# Patient Record
Sex: MALE | Race: WHITE | URBAN - METROPOLITAN AREA
[De-identification: names, ages, dates, MRNs, and addresses within clinical notes are randomized per-mention and may not be internally consistent; named-entity substitution may affect disease eponyms.]

---

## 2019-10-31 ENCOUNTER — APPOINTMENT (OUTPATIENT)
Dept: URBAN - METROPOLITAN AREA SURGERY 9 | Age: 74
Setting detail: DERMATOLOGY
End: 2019-10-31

## 2019-10-31 VITALS — RESPIRATION RATE: 12 BRPM | HEART RATE: 60 BPM | SYSTOLIC BLOOD PRESSURE: 124 MMHG | DIASTOLIC BLOOD PRESSURE: 62 MMHG

## 2019-10-31 DIAGNOSIS — D485 NEOPLASM OF UNCERTAIN BEHAVIOR OF SKIN: ICD-10-CM

## 2019-10-31 PROBLEM — K75.9 INFLAMMATORY LIVER DISEASE, UNSPECIFIED: Status: ACTIVE | Noted: 2019-10-31

## 2019-10-31 PROBLEM — M12.9 ARTHROPATHY, UNSPECIFIED: Status: ACTIVE | Noted: 2019-10-31

## 2019-10-31 PROBLEM — I10 ESSENTIAL (PRIMARY) HYPERTENSION: Status: ACTIVE | Noted: 2019-10-31

## 2019-10-31 PROBLEM — D48.5 NEOPLASM OF UNCERTAIN BEHAVIOR OF SKIN: Status: ACTIVE | Noted: 2019-10-31

## 2019-10-31 PROCEDURE — OTHER RETURN TO REFERRING PROVIDER: OTHER

## 2019-10-31 PROCEDURE — 17312 MOHS ADDL STAGE: CPT

## 2019-10-31 PROCEDURE — OTHER MOHS SURGERY: OTHER

## 2019-10-31 PROCEDURE — 17311 MOHS 1 STAGE H/N/HF/G: CPT

## 2019-10-31 PROCEDURE — OTHER CONSULTATION FOR MOHS SURGERY: OTHER

## 2019-10-31 ASSESSMENT — LOCATION ZONE DERM: LOCATION ZONE: LIP

## 2019-10-31 ASSESSMENT — LOCATION DETAILED DESCRIPTION DERM: LOCATION DETAILED: LEFT UPPER CUTANEOUS LIP

## 2019-10-31 ASSESSMENT — LOCATION SIMPLE DESCRIPTION DERM: LOCATION SIMPLE: LEFT LIP

## 2019-10-31 NOTE — PROCEDURE: CONSULTATION FOR MOHS SURGERY
Anatomic Location From Referring Provider: Left mid philtrum
Name Of The Referring Provider For Procedure: Dr. Avilez
Incorporate Mauc In Note: Yes
Detail Level: Detailed
X Size Of Lesion In Cm (Optional): 0

## 2019-10-31 NOTE — PROCEDURE: MOHS SURGERY
[facility] [address] [address]    [address]    [address]    [facility] [address]    Phone:  [phone]       Thank You for choosing us for your health care visit. We are glad to serve you and happy to provide you with this summary of your visit. Please help us to ensure we have accurate records. If you find anything that needs to be changed, please let our staff know as soon as possible.          Your Demographic Information     Patient Name Sex [place] Male 1943       Ethnic Group Patient Race    Not of  or  Origin White      Your Visit Details     Date & Time Provider Department    2017 8:00 AM [facility] [address] [address]      Your Upcoming Appointment*(Max 10)     2017  9:40 AM CST   Lab Visit with [facility] [address] [facility]   [facility] [address] ([facility])    [address]  [address]  [facility] [address]   [phone]            2017 10:15 AM CST   Consultation with Ilya Fairbanks MD   [facility] ([facility])    [address]  [place] [address]   [phone]              2:30 PM CST   Follow-up Visit with David Borden MD   [facility] ([facility])    [address]  [address]  [facility] [address] [phone]            2017  9:00 AM CST   Follow-up Visit with David Luna MD   [facility] ([facility])    [address]  [address]  [facility] [address] [phone]            2017  8:15 AM CDT   Office Visit with Esa Powers MD   [facility] [address] [address] ([place] [address] [address])    [address]  [address]  [facility] [address]    [phone]            Friday August 11, 2017  8:00 AM CDT   Office Visit with Alice Byrne MD   [facility] ([facility] [address])    [address]  [place] [address]   [phone]              Your To Do List     Follow-Up    Return in about 1 year (around 1/20/2018) for Medicare Wellness Visit.      Conditions Discussed Today or Order-Related Diagnoses        Comments    Medicare annual wellness visit, subsequent    -  Primary       Your Vitals Were     BP Pulse Resp Height Weight BMI    120/60 72 16 6' (1.829 m) 190 lb (86.2 kg) 25.77 kg/m2    Smoking Status                   Former Smoker           Medications Prescribed or Re-Ordered Today     None      Your Current Medications Are        Disp Refills Start End    calcipotriene-betamethasone (TACLONEX) ointment 60 g 6 9/15/2016     Sig: Apply to aa psoriatic plaques bid    Class: Eprescribe    DEXILANT 60 MG capsule 90 capsule 3 8/3/2016 8/3/2017    Sig: TAKE 1 CAPSULE BY MOUTH DAILY    Class: Eprescribe    CRESTOR 20 MG tablet 90 tablet 3 8/1/2016 8/1/2017    Sig: TAKE 1 TABLET BY MOUTH EVERY DAY    Class: Eprescribe    EPINEPHrine (EPIPEN 2-FELIPE) 0.3 MG/0.3ML Solution Auto-injector 1 each 1 6/7/2016 6/7/2017    Sig - Route: Inject 0.3 mLs into the muscle once as needed for Anaphylaxis (Bee sting). - Intramuscular    Class: Eprescribe    aspirin 81 MG tablet        Sig - Route: Take 81 mg by mouth daily. - Oral    Class: Historical Med    Cholecalciferol (VITAMIN D-3) 5000 UNITS TABS   1/2/2013     Sig - Route: Take 1 tablet by mouth daily. - Oral    Class: Historical Med      Allergies     Bee ANAPHYLAXIS      Immunizations History as of 1/20/2017     Name Date    HERPES ZOSTER SHINGLES 2/23/2015    INFLUENZA QUADRIVALENT 1/15/2016    Influenza 10/31/2016, 10/7/2013, 9/10/2012    Influenza High Dose Pres Free 10/14/2014    Pneumococcal Conjugate 13 Valent 3/3/2015    Pneumococcal Polysaccharide  Adult 1/2/2013    Tdap 1/2/2013      Problem List as of 1/20/2017     Low back pain    Hyperlipidemia    Chest discomfort    H/O echocardiogram    COPD (chronic obstructive pulmonary disease)    Sleep apnea    GERD (gastroesophageal reflux disease)    Psoriasis    Sun exposure, moderate    Dysphagia    Rales    Elevated fasting blood sugar              Patient Instructions        Medicare Wellness Visit  Plan for Preventive Care      An important way to stay healthy is to use preventive services provided by doctors and health care providers.  Preventive services can find health problems early when treatment works best and can keep you from getting certain diseases or illnesses.  Medicare pays for many preventive services to help keep you healthy. To complete your personal preventive care plan, you are in need of the following Health Maintenance screening services. The next due date is listed after the specific service.     Health Maintenance Summary     Topic Due On Due Status Completed On    Colorectal Cancer Screening - Colonoscopy Jan 6, 2020 Not Due Jan 6, 2015    Immunization - Td/Tdap Jan 2, 2023 Not Due Jan 2, 2013    Immunization-Zoster  Completed Feb 23, 2015    Immunization - Pneumococcal  Completed Mar 3, 2015    Abdominal Aortic Aneurysm (AAA) Screening   Completed Jul 27, 2016    Medicare Wellness Visit Kevin 15, 2017 Due On Kevin 15, 2016    Immunization-Influenza  Completed Oct 31, 2016           Preventive Care for Women and Men    Cardiovascular Screening:  · Blood tests for cholesterol, lipid and triglyceride levels. High levels increase your risk for heart disease and stroke. High levels can be treated with medications, diet and exercise. Lowering your levels can help keep your heart and blood vessels healthy.  Your provider will order these tests if necessary.    · An xray to detect if you have an abdominal aortic aneurysm (AAA).  Annually 9,000 deaths in the [place] are AAA-related.  You may  have no symptoms; as many as 1 in 3 will rupture if left untreated.  Early diagnosis allows for more effective treatment and cure.  If you have a family history of AAA or are a male age 65-75 who has smoked, you are at higher risk of an AAA.  Your provider can order this test if needed.    Colorectal Screening:  · There are several tests to check for colorectal cancer. You and your doctor will discuss what test is best for you and when to have it done.   · Screening Colonoscopy: exam of the entire colon, seen through a flexible lighted tube.  · Flexible Sigmoidoscopy: exam of the sigmoid portion (last third) of the colon and rectum, seen through a flexible lighted tube.  · Cologuard DNA stool test: a sample of your stool is used to screen for cancer and unseen blood in your stool.  · Fecal Occult Blood Test: a sample of your stool is studied to find any unseen blood    Flu Shot:  · An immunization that helps to prevent influenza. You should get this every year. The best time to get the shot is in the fall.    Pneumococcal Shot:  · An immunization that helps prevent several types of pneumonia. There are now 2 recommended vaccines: previously recommended vaccine that covers 23 types of pneumonia and more recently recommended vaccine that covers 13 additional types of pneumonia.  They are given at least 12 months apart.  Booster immunizations are generally not needed.    Hepatitis B Shot:  · An immunization that helps to protect people from getting Hepatitis B. Hepatitis B is a virus that spreads through contact with infected blood or body fluids. Many people with the virus do not have symptoms.  The virus can lead to serious problems, such as liver disease. Some people are at higher risk than others. Your doctor will tell you if this shot is recommended for you.    Diabetes Screening:  · A test to measure glucose (sugar) in your blood called a fasting blood sugar. Fasting means you cannot have food or drink for at  least 8 hours before the test. This test can detect diabetes long before you may notice symptoms.    Glaucoma Screening:  · Glaucoma screening is performed by your eye doctor. The test measures the fluid pressure inside your eyes to detect if you have glaucoma     Hepatitis C Screening:  · A blood test to determine if you have hepatitis C virus, which attacks the liver and is a major cause of chronic liver disease.  Medicare will cover single screening for all adults born between 1945 & 1965, or high risk patients (current/past history of illicit injection drug use, blood transfusion prior to 1992).  High risk patients with ongoing illicit injection drug use can be screened annually.    Smoking and Tobacco-Use Cessation Counseling:  · Tobacco is the single greatest cause of disease and premature death in our country today. Medication and counseling together can increase a person’s chance of quitting for good.   · Medicare covers 2 quitting attempts per year, with 4 sessions per attempt (8 sessions in a twelve month period)    Preventive Care for Women    Screening Mammograms and Breast Exams:  · An x-ray of your breasts to check for breast cancer before you or your doctor may be able to feel it.  Breast cancer found early can usually be treated with success    Pelvic Exams and Pap Tests:  · An exam to check for cervical and vaginal cancer. A Pap test is a lab test in which cells are taken from your cervix and sent to the lab to detect signs of cervical cancer. When cancer of the cervix is found early, chances for a cure are good. Testing can generally end at age 65, or if a woman has a hysterectomy for a benign condition. A woman's primary care physician will advise more frequent testing if certain abnormalities are found.    Bone Mass Measurements:  · A painless x-ray measurement of your bone density to see if you are at risk for suffering a broken bone. Density refers to the amount of bone or how tightly the bone  tissue is packed.    Preventive Care for Men    Prostate Screening:  · PSA - a prostate cancer blood test. The [place] Preventive Services Task Force recommends against routine screening of healthy men with no signs or symptoms of prostate disease. Men should not ignore urinary symptoms, and discuss their family history with their doctor/provider.      Medicare pays for many preventive services to keep you healthy. For some of these services, you might have to pay a deductible, coinsurance, and / or copayment.  The amounts vary depending on the type of services you need and the kind of Medicare health plan you have.                      Length To Time In Minutes Device Was In Place: 10

## 2019-10-31 NOTE — HPI: MOHS SURGERY CONSULTATION
Has The Cancer Been Biopsied Before?: has been previously biopsied
Body Location Override (Optional): Left mid philtrum
Who Is Your Referring Provider?: Dr. Avilez
When Was Your Biopsy?: 9/10/19

## 2019-10-31 NOTE — PROCEDURE: MOHS SURGERY
Body Location Override (Optional - Billing Will Still Be Based On Selected Body Map Location If Applicable): Left mid philtrum

## 2023-03-23 NOTE — PROCEDURE: MOHS SURGERY
Pending registration complete. Burow's Advancement Flap Text: The defect edges were debeveled with a #15 scalpel blade.  Given the location of the defect and the proximity to free margins a Burow's advancement flap was deemed most appropriate.  Using a sterile surgical marker, the appropriate advancement flap was drawn incorporating the defect and placing the expected incisions within the relaxed skin tension lines where possible.    The area thus outlined was incised deep to adipose tissue with a #15 scalpel blade.  The skin margins were undermined to an appropriate distance in all directions utilizing iris scissors.

## 2024-03-01 NOTE — PROCEDURE: MOHS SURGERY
Procedural process explained   Consent (Temporal Branch)/Introductory Paragraph: The rationale for Mohs was explained to the patient and consent was obtained. The risks, benefits and alternatives to therapy were discussed in detail. Specifically, the risks of damage to the temporal branch of the facial nerve, infection, scarring, bleeding, prolonged wound healing, incomplete removal, allergy to anesthesia, and recurrence were addressed. Prior to the procedure, the treatment site was clearly identified and confirmed by the patient. All components of Universal Protocol/PAUSE Rule completed.